# Patient Record
Sex: FEMALE | Race: WHITE
[De-identification: names, ages, dates, MRNs, and addresses within clinical notes are randomized per-mention and may not be internally consistent; named-entity substitution may affect disease eponyms.]

---

## 2017-04-03 ENCOUNTER — HOSPITAL ENCOUNTER (EMERGENCY)
Dept: HOSPITAL 25 - UCEAST | Age: 42
Discharge: HOME | End: 2017-04-03
Payer: COMMERCIAL

## 2017-04-03 VITALS — DIASTOLIC BLOOD PRESSURE: 88 MMHG | SYSTOLIC BLOOD PRESSURE: 118 MMHG

## 2017-04-03 DIAGNOSIS — J45.909: ICD-10-CM

## 2017-04-03 DIAGNOSIS — Z77.22: ICD-10-CM

## 2017-04-03 DIAGNOSIS — J32.9: Primary | ICD-10-CM

## 2017-04-03 DIAGNOSIS — Z88.0: ICD-10-CM

## 2017-04-03 DIAGNOSIS — Z88.2: ICD-10-CM

## 2017-04-03 PROCEDURE — 99212 OFFICE O/P EST SF 10 MIN: CPT

## 2017-04-03 PROCEDURE — G0463 HOSPITAL OUTPT CLINIC VISIT: HCPCS

## 2017-04-03 NOTE — UC
Throat Pain/Nasal [place] HPI





- HPI Summary


HPI Summary: 





complaint of nasal congestion and cough from allergies for several months


 then over the past weeks she has started to get more congested and sinus 

pressure


over the weekend feels pressure in her face and forehead, worse on the right 

side of her head


cough is sometimes productive


intermittent headaches


bilateral ear pain for 2 days


denies fever and chills


not using albuterol inhaler more often during this illness.


Dr August Gonzalez helps her with lithium alevel-


currently working with [facility]- for chronic fatigue,fever and 

joint pain





- History of Current Complaint


Chief Complaint: UCGeneralIllness


Stated Complaint: FEVER,FATIGUE,CONGESTION


Time Seen by Provider: 04/03/17 12:05


Hx Obtained From: Patient


Hx Last Menstrual Period: 3/24/17





- Allergies/Home Medications


Allergies/Adverse Reactions: 


 Allergies











Allergy/AdvReac Type Severity Reaction Status Date / Time


 


Prochlorperazine Allergy Severe See Comment Verified 11/11/14 17:03





[From Compazine]     


 


Sulfamethoxazole Allergy Severe can't Verified 11/11/14 17:03





w/Trimethoprim   breath,  





[From Bactrim]   swelling  





   and hives  


 


Latex Allergy Intermediate Swelling Verified 02/26/16 13:41





   Of  





   Face,Lips,&  





   Throat  


 


Penicillins Allergy Mild Nausea And Verified 02/26/16 13:41





   Vomiting  


 


MILK Allergy  Anaphylatic Uncoded 04/03/17 10:55





   Shock  











Home Medications: 


 Home Medications





Fluticasone NASAL SPRAY 50MCG* [Flonase NASAL SPRAY 50MCG*] 1 spray NASAL BID 04 /03/17 [History Confirmed 04/03/17]


Ibuprofen [Ibuprofen 200 MG] 400 mg PO BID PRN 04/03/17 [History Confirmed 04/03 /17]


Lamotrigine [Lamictal] 1 tab PO DAILY 04/03/17 [History Confirmed 04/03/17]











PMH/Surg Hx/FS Hx/Imm Hx


Previously Healthy: Yes


Respiratory History Of: Reports: Asthma, Bronchitis


Other History Of: 


   Negative For: Anticoagulant Therapy





- Surgical History


Surgical History: Yes


Surgery Procedure, Year, and Place: tonsillectomy.  e-sure?





- Family History


Known Family History: Positive: Cardiac Disease - mother aortic stenosis, 

Hypertension - mother, Diabetes - mother





- Social History


Occupation: Employed Full-time


Alcohol Use: None


Substance Use Type: None, Prescribed


Smoking Status (MU): Never Smoked Tobacco


Household Exposure Type: Cigarettes





Review of Systems


Constitutional: Fever, Chills, Fatigue


Skin: Negative


Eyes: Negative


ENT: Sore Throat, Ear Ache, Nasal Discharge


Respiratory: Cough


Cardiovascular: Negative


Gastrointestinal: Negative


Genitourinary: Negative


Motor: Negative


Neurovascular: Negative


Musculoskeletal: Negative


Neurological: Headache


Psychological: Negative


All Other Systems Reviewed And Are Negative: Yes





Physical Exam


Triage Information Reviewed: Yes


Appearance: Well-Appearing, No Pain Distress, Thin


Vital Signs: 


 Initial Vital Signs











Temp  98.6 F   04/03/17 10:47


 


Pulse  100   04/03/17 10:47


 


Resp  20   04/03/17 10:47


 


BP  118/88   04/03/17 10:47


 


Pulse Ox  100   04/03/17 10:47











Vital Signs Reviewed: Yes


Eyes: Positive: Conjunctiva Clear


ENT: Positive: Pharyngeal erythema, Nasal congestion, Nasal drainage, TM bulging

, Other: - frontal and maxillary sinus tenderness worse on right than the left 

side.  Negative: TM red


Neck: Positive: No Lymphadenopathy


Respiratory: Positive: Lungs clear, Normal breath sounds, No respiratory 

distress


Cardiovascular: Positive: RRR, No Murmur, Pulses Normal


Abdomen Description: Positive: Nontender, Soft


Bowel Sounds: Positive: Present


Musculoskeletal: Positive: No Edema


Neurological Exam: Normal


Neurological: Positive: Alert


Psychological: Positive: Consolable, Other: - teary, anxious


Skin Exam: Normal





Throat Pain/Nasal Course/Dx





- Course


Course Of Treatment: exam completed.  will treat for sinusitis, continue with 

CFM for chronic fatigue, joint pain and psych issues





- Differential Dx/Diagnosis


Differential Diagnosis/HQI/PQRI: Sinusitis, URI


Provider Diagnoses: sinusitis





Discharge





- Discharge Plan


Condition: Stable


Disposition: HOME


Prescriptions: 


Clarithromycin TAB* [Biaxin 500 MG TAB*] 500 mg PO BID #20 tab


Patient Education Materials:  Sinusitis (ED)


Referrals: 


Jennifer Newsome MD [Primary Care Provider] - 


Additional Instructions: 


SINUSITIS 


What is Sinusitis? 


Sinusitis is inflammation or infection of the lining of the sinuses behind the 

bones in your cheeks or forehead. Sinusitis may occur following a common cold, 

flu, or other infection; allergies; a tooth infection that spreads to the 

sinuses; swimming in contaminated water; pressure changes in airplanes at high 

altitudes; violent sneezing or nose blowing or smoking or breathing other 

peoples smoke. 


Symptoms Might Include:


  Nasal Congestion 


 Sneezing 


 Watery eyes, eye irritation, or eye itching 


 Headaches 


 Pressure in the cheeks 


 Wheezing 


 Trouble smelling 


Sore throat and coughing may occur 





Treatment Recommendations: 


 Take medicines as prescribed until completely gone. 


 Drink plenty of fluids. 


 Use saline nose spray to thin the mucous and help the sinuses drain. 


 Use a vaporizer or humidifier. 


 Apply warm compresses to the face or forehead several times a day for 10 to 20 

minutes. 





Call Your Doctor or Return Here IF: 


 Your pain increases during treatment. 


 You develop a high temperature. 


 You develop unusual swelling around the eyes. 


 You have difficulty with your vision. 


 You develop a severe headache, earache, or toothache. 


 You develop increased fever or fever that does not respond to medication such 

as Tylenol?. 


 You have difficulty breathing or catching your breath. 


 You begin to have any other new symptoms that worry you.

## 2017-10-03 ENCOUNTER — HOSPITAL ENCOUNTER (EMERGENCY)
Dept: HOSPITAL 25 - UCEAST | Age: 42
Discharge: HOME | End: 2017-10-03
Payer: COMMERCIAL

## 2017-10-03 VITALS — SYSTOLIC BLOOD PRESSURE: 113 MMHG | DIASTOLIC BLOOD PRESSURE: 72 MMHG

## 2017-10-03 DIAGNOSIS — J06.9: Primary | ICD-10-CM

## 2017-10-03 DIAGNOSIS — Z88.3: ICD-10-CM

## 2017-10-03 DIAGNOSIS — Z88.0: ICD-10-CM

## 2017-10-03 DIAGNOSIS — Z91.040: ICD-10-CM

## 2017-10-03 DIAGNOSIS — Z91.011: ICD-10-CM

## 2017-10-03 PROCEDURE — 99212 OFFICE O/P EST SF 10 MIN: CPT

## 2017-10-03 PROCEDURE — G0463 HOSPITAL OUTPT CLINIC VISIT: HCPCS

## 2017-10-03 NOTE — UC
Respiratory Complaint HPI





- HPI Summary


HPI Summary: 





Patient presents to the  with CC of chest congestion x 1 week.  She states 

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 

more serious than a URI. Cough with production which is yellow/green, denies SOB

, denies HA, chest pain.  Endorses rhinorrhea.  Denies fevers, sweats, or 

chills  Denies photophobia, neck pain, eye pain, ear pain or throat pain.  She 

has intermittent left sided temporal "swelling" when she has a cold, but denies 

currently.  





- History of Current Complaint


Chief Complaint: UCRespiratory


Stated Complaint: RESP ISSUE


Time Seen by Provider: 10/03/17 15:28


Hx Obtained From: Patient


Hx Last Menstrual Period: 9/27/17


Pregnant?: No


Onset/Duration: Sudden Onset


Timing: Constant


Severity Initially: Mild


Severity Currently: Mild


Pain Intensity: 2


Pain Scale Used: 0-10 Numeric


Character: Cough: Productive


Aggravating Factors: Deep Breaths, Recumbent Position


Alleviating Factors: Upright Position, Spontaneous Resolution


Associated Signs And Symptoms: Positive: URI, Nasal Congestion, Sinus Discomfort





- Risk Factors


Pulmonary Embolism Risk Factors: Negative


Cardiac Risk Factors: Negative


Pseudomonas Risk Factors: Negative


Tuberculosis Risk Factors: Negative





- Allergies/Home Medications


Allergies/Adverse Reactions: 


 Allergies











Allergy/AdvReac Type Severity Reaction Status Date / Time


 


Prochlorperazine Allergy Severe See Comment Verified 11/11/14 17:03





[From Compazine]     


 


Sulfamethoxazole Allergy Severe can't Verified 11/11/14 17:03





w/Trimethoprim   breath,  





[From Bactrim]   swelling  





   and hives  


 


Latex Allergy Intermediate Swelling Verified 02/26/16 13:41





   Of  





   Face,Lips,&  





   Throat  


 


Penicillins Allergy Mild Nausea And Verified 02/26/16 13:41





   Vomiting  


 


MILK Allergy  Anaphylatic Uncoded 04/03/17 10:55





   Shock  














PMH/Surg Hx/FS Hx/Imm Hx


Previously Healthy: Yes


Other History Of: 


   Negative For: Anticoagulant Therapy





- Surgical History


Surgical History: Yes


Surgery Procedure, Year, and Place: tonsillectomy.  e-sure





- Family History


Known Family History: Positive: Cardiac Disease - mother aortic stenosis, 

Hypertension - mother, Diabetes - mother





- Social History


Occupation: Employed Full-time


Lives: With Family


Alcohol Use: None


Substance Use Type: None, Prescribed


Smoking Status (MU): Never Smoked Tobacco


Household Exposure Type: Cigarettes





- Immunization History


Most Recent Influenza Vaccination: 9/2017





Review of Systems


Constitutional: Negative


Skin: Negative


ENT: Nasal Discharge, Sinus Congestion, Sinus Pain/Tenderness


Respiratory: Negative


Cardiovascular: Negative


Gastrointestinal: Negative


Motor: Negative


Neurovascular: Negative


Neurological: Negative


Psychological: Negative


Is Patient Immunocompromised?: No


All Other Systems Reviewed And Are Negative: Yes





Physical Exam


Triage Information Reviewed: Yes


Appearance: Well-Appearing, Well-Nourished


Vital Signs: 


 Initial Vital Signs











Temp  97.6 F   10/03/17 14:57


 


Pulse  101   10/03/17 14:57


 


Resp  20   10/03/17 14:57


 


BP  113/72   10/03/17 14:57


 


Pulse Ox  100   10/03/17 14:57











Vital Signs Reviewed: Yes


Eye Exam: Normal


Eyes: Positive: Conjunctiva Clear


ENT Exam: Normal


ENT: Positive: Hearing grossly normal, Pharynx normal, Nasal congestion, Nasal 

drainage, TMs normal.  Negative: Pharyngeal erythema, Tonsillar swelling, 

Tonsillar exudate, Trismus, Muffled/hoarse voice


Neck exam: Normal


Neck: Positive: Supple, No Lymphadenopathy


Respiratory Exam: Normal


Respiratory: Positive: Chest non-tender, Lungs clear, Normal breath sounds, No 

respiratory distress, No accessory muscle use.  Negative: Respiratory distress, 

Decreased breath sounds, Accessory muscle use


Cardiovascular Exam: Normal


Cardiovascular: Positive: RRR


Musculoskeletal Exam: Normal


Musculoskeletal: Positive: Strength Intact


Neurological Exam: Normal


Psychological Exam: Normal


Psychological: Positive: Normal Response To Family


Skin Exam: Normal





UC Diagnostic Evaluation





- Laboratory


O2 Sat by Pulse Oximetry: 100





Respiratory Course/Dx





- Course


Course Of Treatment: Patient is evaluated for cough and congestion.  Lungs CTA.

  No pharyngeal erythema.  Normal TM.  No sinus pressure noted on PE.  Patient 

is made aware this is likely viral and will need to have supportive care.  She 

agrees with this plan and at this time would not like to be prescribed 

medications.  Return precautions given.





- Differential Dx/Diagnosis


Differential Diagnosis/HQI/PQRI: Bronchitis, Laryngitis, Lower Resp Infection, 

Sinusitis


Provider Diagnoses: Upper Respiratory Infection





Discharge





- Discharge Plan


Condition: Stable


Disposition: HOME


Patient Education Materials:  Upper Respiratory Infection (ED)


Referrals: 


Naty Baires MD [Primary Care Provider] - 


Additional Instructions: 


Humidifier in the home will help.  hot tea with lemon and honey will help with 

any drainage or sore throat.  Tylenol 650mg three times daily for any pain 

related to your symptoms.


OTC cough medications


Theraflu will help with some congestion

## 2017-12-28 ENCOUNTER — HOSPITAL ENCOUNTER (EMERGENCY)
Dept: HOSPITAL 25 - UCEAST | Age: 42
Discharge: LEFT BEFORE DISCHARGE | End: 2017-12-28
Payer: SELF-PAY

## 2017-12-28 DIAGNOSIS — J34.89: Primary | ICD-10-CM

## 2017-12-28 DIAGNOSIS — Z53.21: ICD-10-CM

## 2018-02-02 ENCOUNTER — HOSPITAL ENCOUNTER (EMERGENCY)
Dept: HOSPITAL 25 - UCEAST | Age: 43
Discharge: HOME | End: 2018-02-02
Payer: COMMERCIAL

## 2018-02-02 VITALS — DIASTOLIC BLOOD PRESSURE: 76 MMHG | SYSTOLIC BLOOD PRESSURE: 109 MMHG

## 2018-02-02 DIAGNOSIS — J32.0: Primary | ICD-10-CM

## 2018-02-02 PROCEDURE — G0463 HOSPITAL OUTPT CLINIC VISIT: HCPCS

## 2018-02-02 PROCEDURE — 99212 OFFICE O/P EST SF 10 MIN: CPT

## 2018-02-02 NOTE — UC
Respiratory Complaint HPI





- HPI Summary


HPI Summary: 





States she has been sick since December and was diagnosed wth sinusitis, 

started a 10 day course of doxycycline on December 29 2017. She states she had 

a recurrence of it several days ago after a cold and has pain on left side of 

face again with post nasal discharge, and low grade fever. LMD 1-11-18. 





- History of Current Complaint


Chief Complaint: UCRespiratory


Stated Complaint: SINUS ISSUE


Time Seen by Provider: 02/02/18 14:18


Hx Obtained From: Patient


Hx Last Menstrual Period: 1/11/2018


Onset/Duration: Gradual Onset, Lasting Days


Timing: Constant


Severity Initially: Mild


Severity Currently: Moderate


Pain Intensity: 5


Character: Cough: Nonproductive


Aggravating Factors: Allergens


Associated Signs And Symptoms: Positive: Nasal Congestion, Sinus Discomfort


Related History: Seasonal Allergies





- Risk Factors


Pulmonary Embolism Risk Factors: Negative


Cardiac Risk Factors: Negative


Pseudomonas Risk Factors: Negative


Tuberculosis Risk Factors: Negative





- Allergies/Home Medications


Allergies/Adverse Reactions: 


 Allergies











Allergy/AdvReac Type Severity Reaction Status Date / Time


 


MS Prochlorperazine Allergy Severe See Comment Verified 02/02/18 14:35





[From Compazine]     


 


MS Sulfamethoxazole Allergy Severe can't Verified 02/02/18 14:35





w/Trimethoprim   breath,  





[From Bactrim]   swelling  





   and hives  


 


MS Latex [Latex] Allergy Intermediate Swelling Verified 02/02/18 14:35





   Of  





   Face,Lips,&  





   Throat  


 


MS Penicillins [Penicillins] Allergy Mild Nausea And Verified 02/02/18 14:35





   Vomiting  


 


MILK Allergy  Anaphylatic Uncoded 02/02/18 14:35





   Shock  











Home Medications: 


 Home Medications





B-Complex Vitamins [Vitamin B Complex] 1 tab PO DAILY 02/02/18 [History 

Confirmed 02/02/18]


Cholecalciferol [Vitamin D] 1,000 unit PO DAILY 02/02/18 [History Confirmed 02/ 02/18]


Flaxseed (Linseed) [Flax Seed Oil 1000 mg] 2 cap PO DAILY 02/02/18 [History 

Confirmed 02/02/18]


Lactobacillus [Probiotic] 1 cap PO DAILY 02/02/18 [History Confirmed 02/02/18]











PMH/Surg Hx/FS Hx/Imm Hx


Previously Healthy: Yes


Respiratory History: Asthma


Psychological History: Bipolar Disorder


Other History Of: 


   Negative For: Anticoagulant Therapy





- Surgical History


Surgical History: Yes


Surgery Procedure, Year, and Place: tonsillectomy.  e-sure





- Family History


Known Family History: Positive: Cardiac Disease - mother aortic stenosis, 

Hypertension - mother, Diabetes - mother





- Social History


Alcohol Use: Rare


Substance Use Type: None, Prescribed


Smoking Status (MU): Never Smoked Tobacco


Household Exposure Type: Cigarettes





- Immunization History


Most Recent Influenza Vaccination: 9/2017





Review of Systems


Constitutional: Negative


ENT: Nasal Discharge, Sinus Congestion, Sinus Pain/Tenderness


Respiratory: Cough


All Other Systems Reviewed And Are Negative: Yes





Physical Exam


Triage Information Reviewed: Yes


Appearance: Well-Appearing


Vital Signs: 


 Initial Vital Signs











Temp  98.5 F   02/02/18 14:40


 


Pulse  90   02/02/18 14:40


 


Resp  18   02/02/18 14:40


 


BP  109/76   02/02/18 14:40


 


Pulse Ox  100   02/02/18 14:40











Vital Signs Reviewed: Yes


Eye Exam: Normal


ENT: Positive: Hearing grossly normal, Pharynx normal, TMs normal, Sinus 

tenderness


Dental Exam: Normal


Neck exam: Normal


Respiratory Exam: Normal


Cardiovascular Exam: Normal





UC Diagnostic Evaluation





- Laboratory


O2 Sat by Pulse Oximetry: 100





Respiratory Course/Dx





- Course


Course Of Treatment: FLuid intake, nasal toileting with NS spray, continue 

flonase, continue inhalers as needed.  Return to clinic if fever > or = to 

101.5F, or symptoms of sinus pressure worsen.





- Differential Dx/Diagnosis


Provider Diagnoses: Left frontal and maxillary sinusitis





Discharge





- Discharge Plan


Condition: Stable


Disposition: HOME


Patient Education Materials:  Sinusitis (ED)


Referrals: 


No Primary Care Phys,NOPCP [Medical Doctor] -

## 2019-03-21 ENCOUNTER — HOSPITAL ENCOUNTER (EMERGENCY)
Dept: HOSPITAL 25 - ED | Age: 44
Discharge: HOME | End: 2019-03-21
Payer: COMMERCIAL

## 2019-03-21 ENCOUNTER — HOSPITAL ENCOUNTER (EMERGENCY)
Dept: HOSPITAL 25 - UCEAST | Age: 44
Discharge: ANOTHER HOSPITAL | End: 2019-03-21
Payer: COMMERCIAL

## 2019-03-21 VITALS — DIASTOLIC BLOOD PRESSURE: 69 MMHG | SYSTOLIC BLOOD PRESSURE: 111 MMHG

## 2019-03-21 VITALS — SYSTOLIC BLOOD PRESSURE: 114 MMHG | DIASTOLIC BLOOD PRESSURE: 78 MMHG

## 2019-03-21 DIAGNOSIS — Z88.1: ICD-10-CM

## 2019-03-21 DIAGNOSIS — R20.0: ICD-10-CM

## 2019-03-21 DIAGNOSIS — Z88.0: ICD-10-CM

## 2019-03-21 DIAGNOSIS — R07.89: Primary | ICD-10-CM

## 2019-03-21 DIAGNOSIS — Z88.2: ICD-10-CM

## 2019-03-21 DIAGNOSIS — R55: ICD-10-CM

## 2019-03-21 DIAGNOSIS — Z91.011: ICD-10-CM

## 2019-03-21 DIAGNOSIS — Z88.8: ICD-10-CM

## 2019-03-21 DIAGNOSIS — Z91.040: ICD-10-CM

## 2019-03-21 DIAGNOSIS — Z87.891: ICD-10-CM

## 2019-03-21 DIAGNOSIS — R53.83: ICD-10-CM

## 2019-03-21 LAB
ALBUMIN SERPL BCG-MCNC: 4.1 G/DL (ref 3.2–5.2)
ALBUMIN/GLOB SERPL: 1.8 {RATIO} (ref 1–3)
ALP SERPL-CCNC: 66 U/L (ref 34–104)
ALT SERPL W P-5'-P-CCNC: 13 U/L (ref 7–52)
ANION GAP SERPL CALC-SCNC: 5 MMOL/L (ref 2–11)
AST SERPL-CCNC: 15 U/L (ref 13–39)
BASOPHILS # BLD AUTO: 0.1 10^3/UL (ref 0–0.2)
BUN SERPL-MCNC: 11 MG/DL (ref 6–24)
BUN/CREAT SERPL: 15.1 (ref 8–20)
CALCIUM SERPL-MCNC: 9 MG/DL (ref 8.6–10.3)
CHLORIDE SERPL-SCNC: 112 MMOL/L (ref 101–111)
EOSINOPHIL # BLD AUTO: 0.1 10^3/UL (ref 0–0.6)
GLOBULIN SER CALC-MCNC: 2.3 G/DL (ref 2–4)
GLUCOSE SERPL-MCNC: 96 MG/DL (ref 70–100)
HCG SERPL QL: < 0.6 MIU/ML
HCO3 SERPL-SCNC: 23 MMOL/L (ref 22–32)
HCT VFR BLD AUTO: 39 % (ref 33–41)
HGB BLD-MCNC: 12.6 G/DL (ref 12–16)
LYMPHOCYTES # BLD AUTO: 1 10^3/UL (ref 1–4.8)
MCH RBC QN AUTO: 29 PG (ref 27–31)
MCHC RBC AUTO-ENTMCNC: 32 G/DL (ref 31–36)
MCV RBC AUTO: 90 FL (ref 80–97)
MONOCYTES # BLD AUTO: 0.7 10^3/UL (ref 0–0.8)
NEUTROPHILS # BLD AUTO: 11.6 10^3/UL (ref 1.5–7.7)
NRBC # BLD AUTO: 0 10^3/UL
NRBC BLD QL AUTO: 0
PLATELET # BLD AUTO: 216 10^3/UL (ref 150–450)
POTASSIUM SERPL-SCNC: 3.8 MMOL/L (ref 3.5–5)
PROT SERPL-MCNC: 6.4 G/DL (ref 6.4–8.9)
RBC # BLD AUTO: 4.33 10^6 /UL (ref 3.7–4.87)
SODIUM SERPL-SCNC: 140 MMOL/L (ref 135–145)
TROPONIN I SERPL-MCNC: 0 NG/ML (ref ?–0.04)
WBC # BLD AUTO: 13.4 10^3/UL (ref 3.5–10.8)

## 2019-03-21 PROCEDURE — 93005 ELECTROCARDIOGRAM TRACING: CPT

## 2019-03-21 PROCEDURE — 84702 CHORIONIC GONADOTROPIN TEST: CPT

## 2019-03-21 PROCEDURE — 80053 COMPREHEN METABOLIC PANEL: CPT

## 2019-03-21 PROCEDURE — 83605 ASSAY OF LACTIC ACID: CPT

## 2019-03-21 PROCEDURE — 99213 OFFICE O/P EST LOW 20 MIN: CPT

## 2019-03-21 PROCEDURE — 84484 ASSAY OF TROPONIN QUANT: CPT

## 2019-03-21 PROCEDURE — 99283 EMERGENCY DEPT VISIT LOW MDM: CPT

## 2019-03-21 PROCEDURE — 36415 COLL VENOUS BLD VENIPUNCTURE: CPT

## 2019-03-21 PROCEDURE — G0463 HOSPITAL OUTPT CLINIC VISIT: HCPCS

## 2019-03-21 PROCEDURE — 85379 FIBRIN DEGRADATION QUANT: CPT

## 2019-03-21 PROCEDURE — 85025 COMPLETE CBC W/AUTO DIFF WBC: CPT

## 2019-03-21 PROCEDURE — 71045 X-RAY EXAM CHEST 1 VIEW: CPT

## 2019-03-21 NOTE — ED
HPI Chest Pain





- HPI Summary


HPI Summary: 





A 43 y/o female brought in [facility] was sent over from  and 

presents to [facility] with a chief complaint of left sided intermittent chest pain 

since worsening last night. She describes her pain as soreness and rated her 

pain as a 2/10 in severity. She also reports that she had a syncopal episode 

today and that she sometimes does have syncopal episodes when she has her chest 

pain, but this time she fell hard on the floor. She also reports that at night 

her arms get numb, but notes that her left arm is still numb. She claims that 

she has seen doctors for her numbness but was told that she was anxious. She 

has not had an MRI done. She also notes that she is bipolar. She also c/o 

fatigue. Vital signs while in room  HR: 81 bpm, O2 Sat: 98, BP: 105/62.





- History of Current Complaint


Chief Complaint: EDChestWallPain


Time Seen by Provider: 03/21/19 12:24


Hx Obtained From: Patient, EMS


Hx Last Menstrual Period: 1/11/2018


Onset/Duration: Started Hours Ago, Still Present


Timing: Intermittent, Lasting Minutes


Initial Severity: Mild


Current Severity: Mild


Pain Intensity: 2


Pain Scale Used: 0-10 Numeric


Chest Pain Location: Diffuse - left sided


Chest Pain Radiates: Yes


Chest Pain Radiates To:: Arm - Pt still has numbness in left arm


Character: Tightness, Other: - soreness


Aggravating Factor(s): Nothing


Alleviating Factor(s): Nothing


Associated Signs and Symptoms: Positive: Numbness, Syncope.  Negative: Fever





- Allergy/Home Medications


Allergies/Adverse Reactions: 


 Allergies











Allergy/AdvReac Type Severity Reaction Status Date / Time


 


prochlorperazine Allergy Severe See Comment Verified 03/21/19 11:45





[From Compazine]     


 


sulfamethoxazole Allergy Severe Anaphylatic Verified 03/21/19 11:45





[From Bactrim]   Shock  


 


trimethoprim [From Bactrim] Allergy Severe Anaphylatic Verified 03/21/19 11:45





   Shock  


 


latex Allergy Intermediate Swelling Verified 03/21/19 11:45





   Of  





   Face,Lips,&  





   Throat  


 


Penicillins Allergy Mild Nausea And Verified 03/21/19 11:45





   Vomiting  


 


MILK Allergy  Anaphylatic Uncoded 03/21/19 11:45





   Shock  











Home Medications: 


 Home Medications





Amphetamine MIXED SALT TAB* [Adderall TAB*] 20 mg PO BID 03/21/19 [History 

Confirmed 03/21/19]


Cholecalciferol TAB* [Vitamin D TAB*] 1,000 unit PO DAILY 03/21/19 [History 

Confirmed 03/21/19]


Vitamin B Complex CAP* [B Complex CAP*] 1 cap PO DAILY 03/21/19 [History 

Confirmed 03/21/19]


lamoTRIgine TAB(*) [LaMICtal TAB(*)] 25 mg PO DAILY 03/21/19 [History Confirmed 

03/21/19]











PMH/Surg Hx/FS Hx/Imm Hx


Endocrine/Hematology History: 


   Denies: Hx Anticoagulant Therapy, Hx Diabetes, Hx Thyroid Disease


Cardiovascular History: 


   Denies: Hx Hypertension


Respiratory History: Reports: Hx Asthma - allergic asthma


   Denies: Hx Chronic Obstructive Pulmonary Disease (COPD)


GI History: 


   Denies: Hx Ulcer


Musculoskeletal History: 


   Denies: Hx Osteoporosis





- Surgical History


Surgery Procedure, Year, and Place: tonsillectomy.  e-sure





- Immunization History


Date of Tetanus Vaccine: unknown


Infectious Disease History: No


Infectious Disease History: 


   Denies: Hx Hepatitis, Hx Human Immunodeficiency Virus (HIV), History Other 

Infectious Disease, Traveled Outside the [place] in Last 30 Days





- Family History


Known Family History: Positive: Cardiac Disease - mother aortic stenosis, 

Hypertension - mother, Diabetes - mother





- Social History


Alcohol Use: Rare


Substance Use Type: Reports: None


Smoking Status (MU): Former Smoker





Review of Systems


Positive: Fatigue.  Negative: Fever


Positive: Chest Pain


Positive: Numbness, Syncope


All Other Systems Reviewed And Are Negative: Yes





Physical Exam





- Summary


Physical Exam Summary: 





Appearance: The patient is well-nourished in no acute distress and in no acute 

pain.


 


Skin: The skin is warm and dry and skin color reflects adequate perfusion.





HEENT: The head is normocephalic and atraumatic. The pupils are equal and 

reactive. The conjunctivae are clear and without drainage. Nares are patent and 

without drainage. Mouth reveals moist mucous membranes and the throat is 

without erythema and exudate. The external ears are intact. The ear canals are 

patent and without drainage. The tympanic membranes are intact.


 


Neck: The neck is supple with full range of motion and non-tender. There are no 

carotid bruits. There is no neck vein distension.


 


Respiratory: Tender left pectoralis. Lungs are clear to auscultation and breath 

sounds are symmetrical and equal.


 


Cardiovascular: Heart is regular rate and rhythm. There is no murmur or rub 

auscultated. There is no peripheral edema and pulses are symmetrical and equal.


 


Abdomen: The abdomen is soft and non-tender. There are normal bowel sounds 

heard in all four quadrants and there is no organomegaly palpated.


 


Musculoskeletal: There is no back tenderness noted. Extremities are non-tender 

with full range of motion. There is good capillary refill. There is no 

peripheral edema or calf tenderness elicited.


 


Neurological: Patient is alert and oriented to person, place and time. The 

patient has symmetrical motor strength in all four extremities. Cranial nerves 

are grossly intact. Deep tendon reflexes are symmetrical and equal in all four 

extremities.


 


Psychiatric: The patient has an appropriate affect and does not exhibit any 

anxiety or depression.


Triage Information Reviewed: Yes


Vital Signs On Initial Exam: 


 Initial Vitals











Temp Pulse Resp BP Pulse Ox


 


 98.4 F   75   16   105/62   98 


 


 03/21/19 12:19  03/21/19 12:19  03/21/19 12:19  03/21/19 12:19  03/21/19 12:19











Vital Signs Reviewed: Yes





Diagnostics





- Vital Signs


 Vital Signs











  Temp Pulse Resp BP Pulse Ox


 


 03/21/19 12:19  98.4 F  75  16  105/62  98














- Laboratory


Result Diagrams: 


 03/21/19 12:56





 03/21/19 12:56


Lab Statement: Any lab studies that have been ordered have been reviewed, and 

results considered in the medical decision making process.





- Radiology


  ** CXR


Radiology Interpretation Completed By: Radiologist


Summary of Radiographic Findings: NO ACTIVE CARDIOPULMONARY DISEASE.  ED 

physician has reviewed this imaging report.





- EKG


  ** 13:04


Cardiac Rate: NL - 64 bpm


EKG Rhythm: Sinus Rhythm


Summary of EKG Findings: Normal sinus rhythm at 64 bpm, normal ST, no ectopy, 

no STEMI





Chest Pain Course/Dx





- Course


Course Of Treatment: Ms. Luo presented with chest pain and a syncopal 

episode.  She was minimizing everything when I evaluated her stating that she 

very frequently has a similar chest pain and that she faints a lot.  

Nevertheless she was kept on a monitor and worked up for cardiac including 

delayed troponin.  She was nontoxic in appearance, her vitals were stable and 

her workup was negative.  I recommended close follow-up and again she feels 

that nothing was going on and that these are chronic conditions.





- Diagnoses


Provider Diagnoses: 


 Chest pain








Discharge





- Sign-Out/Discharge


Documenting (check all that apply): Patient Departure - DC


Patient Received Moderate/Deep Sedation with Procedure: No





- Discharge Plan


Condition: Stable


Disposition: HOME


Patient Education Materials:  Chest Pain (DC)


Referrals: 


Naty Baires MD [Primary Care Provider] -  (2-3 days)


Additional Instructions: 


Follow up with your PCP in 2-3 days.


Return to the ED if you experience any new or worsening symptoms.





- Billing Disposition and Condition


Condition: STABLE


Disposition: Home





- Attestation Statements


Document Initiated by Elviraibe: Yes


Documenting Scribe: Quincy Koehler


Provider For Whom Elviraibe is Documenting (Include Credential): Edis Woods MD


Scribe Attestation: 


I, Quincy Koehler, scribed for Edis Woods MD on 03/22/19 at 1246. 


Scribe Documentation Reviewed: Yes


Provider Attestation: 


The documentation as recorded by the Quincy butler accurately 

reflects the service I personally performed and the decisions made by me, 

Edis Woods MD


Status of Scribe Document: Viewed

## 2019-03-21 NOTE — ED
HPI Chest Pain





- HPI Summary


HPI Summary: 





45 yo WF bipolar and seizure DO, p/w acute syncopal episode this AM, thinks she 

blacked out for a few seconds associated with feeling weak all over and left 

upper CP radiating to left arm exacerbated by movement that started about a 

week ago when shoveling snow. Pt takes Lithium, Topamax and Lamictal at home, 

Denies recent febrile illness, seizures or changes in appetite. States her BP 

runs "low" in 110's and knows when her BP "runs low" but this timeshe felt she 

passed out" very fast", denies any one sides weakness, or focal deficits





- History of Current Complaint


Time Seen by Provider: 03/21/19 11:36


Hx Obtained From: Patient, Family/Caretaker


Hx Last Menstrual Period: 1/11/2018


Onset/Duration: Started Hours Ago, Started Days Ago


Timing: Intermittent


Initial Severity: Moderate


Current Severity: Moderate


Chest Pain Location: Mid Sternal, Upper Sternal, Left Anterior


Chest Pain Radiates: Yes





- Allergy/Home Medications


Allergies/Adverse Reactions: 


 Allergies











Allergy/AdvReac Type Severity Reaction Status Date / Time


 


prochlorperazine Allergy Severe See Comment Verified 03/21/19 11:45





[From Compazine]     


 


sulfamethoxazole Allergy Severe Anaphylatic Verified 03/21/19 11:45





[From Bactrim]   Shock  


 


trimethoprim [From Bactrim] Allergy Severe Anaphylatic Verified 03/21/19 11:45





   Shock  


 


latex Allergy Intermediate Swelling Verified 03/21/19 11:45





   Of  





   Face,Lips,&  





   Throat  


 


Penicillins Allergy Mild Nausea And Verified 03/21/19 11:45





   Vomiting  


 


MILK Allergy  Anaphylatic Uncoded 03/21/19 11:45





   Shock  











Home Medications: 


 Home Medications





Fluoxetine HCl [Prozac] 40 mg PO QPM 03/21/19 [History Confirmed 03/21/19]


Turmeric 400 mg PO DAILY 03/21/19 [History Confirmed 03/21/19]











PMH/Surg Hx/FS Hx/Imm Hx


Previously Healthy: Yes


Endocrine/Hematology History: 


   Denies: Hx Anticoagulant Therapy, Hx Diabetes, Hx Thyroid Disease


Cardiovascular History: 


   Denies: Hx Hypertension


Respiratory History: Reports: Hx Asthma - allergic asthma


   Denies: Hx Chronic Obstructive Pulmonary Disease (COPD)


GI History: 


   Denies: Hx Ulcer


Musculoskeletal History: 


   Denies: Hx Osteoporosis





- Surgical History


Surgery Procedure, Year, and Place: tonsillectomy.  e-sure





- Immunization History


Date of Tetanus Vaccine: unknown


Infectious Disease History: 


   Denies: Hx Hepatitis, Hx Human Immunodeficiency Virus (HIV), History Other 

Infectious Disease, Traveled Outside the [place] in Last 30 Days





- Family History


Known Family History: Positive: Cardiac Disease - mother aortic stenosis, 

Hypertension - mother, Diabetes - mother





- Social History


Alcohol Use: Rare


Substance Use Type: Reports: None, Prescribed


Smoking Status (MU): Never Smoked Tobacco





Review of Systems





- ROS Summary


Review of Systems Summary: 


Constitutional: Negative


Skin: Negative 


Eyes: Negative


ENT: Negative


Cardiovascular: CP radiating to left arm


Respiratory: Negative


Gastrointestinal: Negative


Genitourinary: Negative


Musculoskeletal: Negative


Neurological: Negative


Psychological: Normal


All Other Systems Reviewed And Are Negative: Yes





All Other Systems Reviewed And Are Negative: Yes





Physical Exam





- Summary


Physical Exam Summary: 


Vital Signs Reviewed: Yes


Appearance: Positive: mild pain distress, ill appearing, thin


Skin: Positive: Warm


Head/Face: Positive: Normal Head/Face Inspection


Eyes: Positive: Normal


ENT: Positive: Normal ENT inspection, DRY MM


Neck: Positive: Supple


Respiratory/Lung Sounds: Positive: Clear to Auscultation.  Negative: Rales, 

Rhonchi, Wheezes


Cardiovascular: Positive: Normal, RRR, S1, S2


Abdomen Description: Positive: Nontender


Musculoskeletal: Positive: Normal


Neurological: Positive: Normal, CN Intact II-III, NO focal deficits


Psychiatric: Positive: Normal, Affect/Mood Appropriate





Triage Information Reviewed: Yes





Chest Pain Course/Dx





- Course


Course Of Treatment: acute syncope with CP-EKG NEG, pt on too many medications(

Li, topamax and Lamictal) and h/o autonomic instability to pinpointa definitive 

dx- ie, will need to r/o ACS vs CVA vs metabolic derangement as a cause for 

current acute sx, called ED and informed charge nurse Lee, called 911, [place] 

for transfer [facility]





- Chest Pain


Differential Diagnosis/HQI/PQRI: Acute MI, ACS, Angina, Chest Wall, GI Disease





- Diagnoses


Provider Diagnoses: 


 Syncope and collapse, Chest pain at rest








Discharge





- Sign-Out/Discharge


Documenting (check all that apply): Patient Departure


All imaging exams completed and their final reports reviewed: Yes





- Discharge Plan


Condition: Stable


Disposition: TRANS [facility] CARE FAC


Referrals: 


Naty Baires MD [Primary Care Provider] - 





- Billing Disposition and Condition


Condition: STABLE


Disposition: Trans Higher Lvl of Care Fac
